# Patient Record
Sex: MALE | Race: WHITE | NOT HISPANIC OR LATINO | Employment: FULL TIME | URBAN - METROPOLITAN AREA
[De-identification: names, ages, dates, MRNs, and addresses within clinical notes are randomized per-mention and may not be internally consistent; named-entity substitution may affect disease eponyms.]

---

## 2022-05-08 ENCOUNTER — HOSPITAL ENCOUNTER (EMERGENCY)
Facility: HOSPITAL | Age: 57
Discharge: HOME | End: 2022-05-08
Attending: EMERGENCY MEDICINE

## 2022-05-08 ENCOUNTER — APPOINTMENT (EMERGENCY)
Dept: CT IMAGING | Facility: HOSPITAL | Age: 57
End: 2022-05-08

## 2022-05-08 VITALS
TEMPERATURE: 98 F | OXYGEN SATURATION: 98 % | HEIGHT: 67 IN | BODY MASS INDEX: 26.68 KG/M2 | HEART RATE: 85 BPM | WEIGHT: 170 LBS | SYSTOLIC BLOOD PRESSURE: 177 MMHG | RESPIRATION RATE: 18 BRPM | DIASTOLIC BLOOD PRESSURE: 89 MMHG

## 2022-05-08 DIAGNOSIS — H05.011: Primary | ICD-10-CM

## 2022-05-08 DIAGNOSIS — J32.9 SINUSITIS: ICD-10-CM

## 2022-05-08 DIAGNOSIS — G03.9 MENINGITIS: ICD-10-CM

## 2022-05-08 LAB
ANION GAP SERPL CALCULATED.3IONS-SCNC: 11 MMOL/L (ref 4–13)
ATRIAL RATE: 85 BPM
BASOPHILS # BLD AUTO: 0.04 THOUSANDS/ΜL (ref 0–0.1)
BASOPHILS NFR BLD AUTO: 0 % (ref 0–1)
BUN SERPL-MCNC: 19 MG/DL (ref 5–25)
CALCIUM SERPL-MCNC: 9.5 MG/DL (ref 8.4–10.2)
CHLORIDE SERPL-SCNC: 101 MMOL/L (ref 96–108)
CO2 SERPL-SCNC: 25 MMOL/L (ref 21–32)
CREAT SERPL-MCNC: 0.93 MG/DL (ref 0.6–1.3)
CRP SERPL HS-MCNC: 72.4 MG/L
EOSINOPHIL # BLD AUTO: 0.09 THOUSAND/ΜL (ref 0–0.61)
EOSINOPHIL NFR BLD AUTO: 1 % (ref 0–6)
ERYTHROCYTE [DISTWIDTH] IN BLOOD BY AUTOMATED COUNT: 12 % (ref 11.6–15.1)
GFR SERPL CREATININE-BSD FRML MDRD: 90 ML/MIN/1.73SQ M
GLUCOSE SERPL-MCNC: 123 MG/DL (ref 65–140)
HCT VFR BLD AUTO: 46 % (ref 36.5–49.3)
HGB BLD-MCNC: 15.2 G/DL (ref 12–17)
IMM GRANULOCYTES # BLD AUTO: 0.04 THOUSAND/UL (ref 0–0.2)
IMM GRANULOCYTES NFR BLD AUTO: 0 % (ref 0–2)
LACTATE SERPL-SCNC: 0.6 MMOL/L (ref 0.5–2)
LYMPHOCYTES # BLD AUTO: 1.86 THOUSANDS/ΜL (ref 0.6–4.47)
LYMPHOCYTES NFR BLD AUTO: 14 % (ref 14–44)
MCH RBC QN AUTO: 31.9 PG (ref 26.8–34.3)
MCHC RBC AUTO-ENTMCNC: 33 G/DL (ref 31.4–37.4)
MCV RBC AUTO: 96 FL (ref 82–98)
MONOCYTES # BLD AUTO: 1.78 THOUSAND/ΜL (ref 0.17–1.22)
MONOCYTES NFR BLD AUTO: 13 % (ref 4–12)
NEUTROPHILS # BLD AUTO: 9.89 THOUSANDS/ΜL (ref 1.85–7.62)
NEUTS SEG NFR BLD AUTO: 72 % (ref 43–75)
NRBC BLD AUTO-RTO: 0 /100 WBCS
P AXIS: 65 DEGREES
PLATELET # BLD AUTO: 282 THOUSANDS/UL (ref 149–390)
PMV BLD AUTO: 10.5 FL (ref 8.9–12.7)
POTASSIUM SERPL-SCNC: 3.4 MMOL/L (ref 3.5–5.3)
PR INTERVAL: 126 MS
QRS AXIS: 50 DEGREES
QRSD INTERVAL: 90 MS
QT INTERVAL: 352 MS
QTC INTERVAL: 418 MS
RBC # BLD AUTO: 4.77 MILLION/UL (ref 3.88–5.62)
SODIUM SERPL-SCNC: 137 MMOL/L (ref 135–147)
T WAVE AXIS: 55 DEGREES
VENTRICULAR RATE: 85 BPM
WBC # BLD AUTO: 13.7 THOUSAND/UL (ref 4.31–10.16)

## 2022-05-08 PROCEDURE — G1004 CDSM NDSC: HCPCS

## 2022-05-08 PROCEDURE — 80048 BASIC METABOLIC PNL TOTAL CA: CPT | Performed by: EMERGENCY MEDICINE

## 2022-05-08 PROCEDURE — 86141 C-REACTIVE PROTEIN HS: CPT | Performed by: INTERNAL MEDICINE

## 2022-05-08 PROCEDURE — 93010 ELECTROCARDIOGRAM REPORT: CPT | Performed by: INTERNAL MEDICINE

## 2022-05-08 PROCEDURE — NC001 PR NO CHARGE: Performed by: INTERNAL MEDICINE

## 2022-05-08 PROCEDURE — 96367 TX/PROPH/DG ADDL SEQ IV INF: CPT

## 2022-05-08 PROCEDURE — 93005 ELECTROCARDIOGRAM TRACING: CPT

## 2022-05-08 PROCEDURE — 99284 EMERGENCY DEPT VISIT MOD MDM: CPT

## 2022-05-08 PROCEDURE — 36415 COLL VENOUS BLD VENIPUNCTURE: CPT | Performed by: EMERGENCY MEDICINE

## 2022-05-08 PROCEDURE — 85025 COMPLETE CBC W/AUTO DIFF WBC: CPT | Performed by: EMERGENCY MEDICINE

## 2022-05-08 PROCEDURE — 96365 THER/PROPH/DIAG IV INF INIT: CPT

## 2022-05-08 PROCEDURE — 87040 BLOOD CULTURE FOR BACTERIA: CPT | Performed by: INTERNAL MEDICINE

## 2022-05-08 PROCEDURE — 70487 CT MAXILLOFACIAL W/DYE: CPT

## 2022-05-08 PROCEDURE — 83605 ASSAY OF LACTIC ACID: CPT | Performed by: INTERNAL MEDICINE

## 2022-05-08 PROCEDURE — 99284 EMERGENCY DEPT VISIT MOD MDM: CPT | Performed by: EMERGENCY MEDICINE

## 2022-05-08 PROCEDURE — 96366 THER/PROPH/DIAG IV INF ADDON: CPT

## 2022-05-08 RX ORDER — AMOXICILLIN AND CLAVULANATE POTASSIUM 875; 125 MG/1; MG/1
1 TABLET, FILM COATED ORAL EVERY 12 HOURS
Qty: 2 TABLET | Refills: 0 | Status: SHIPPED | OUTPATIENT
Start: 2022-05-08 | End: 2022-05-10

## 2022-05-08 RX ORDER — CEFTRIAXONE 2 G/50ML
2000 INJECTION, SOLUTION INTRAVENOUS ONCE
Status: COMPLETED | OUTPATIENT
Start: 2022-05-08 | End: 2022-05-08

## 2022-05-08 RX ADMIN — IOHEXOL 90 ML: 350 INJECTION, SOLUTION INTRAVENOUS at 06:36

## 2022-05-08 RX ADMIN — VANCOMYCIN HYDROCHLORIDE 1500 MG: 1 INJECTION, POWDER, LYOPHILIZED, FOR SOLUTION INTRAVENOUS at 06:38

## 2022-05-08 RX ADMIN — CEFTRIAXONE 2000 MG: 2 INJECTION, SOLUTION INTRAVENOUS at 05:30

## 2022-05-08 NOTE — ED PROVIDER NOTES
History  Chief Complaint   Patient presents with    Eye Swelling     62 yo otherwise healthy male presenting for approximately 1 week of worsening R eyelid swelling and over the last day has been unable to open his R eye  States he has been using tylenol as directed which controls the pain  Denies fevers, night sweats, chills, any other issues or complaints  None       History reviewed  No pertinent past medical history  History reviewed  No pertinent surgical history  History reviewed  No pertinent family history  I have reviewed and agree with the history as documented  E-Cigarette/Vaping    E-Cigarette Use Never User      E-Cigarette/Vaping Substances    Nicotine No      Social History     Tobacco Use    Smoking status: Never Smoker    Smokeless tobacco: Never Used   Vaping Use    Vaping Use: Never used   Substance Use Topics    Alcohol use: Not Currently    Drug use: Not Currently       Review of Systems   Eyes:        R eye swelling   All other systems reviewed and are negative  Physical Exam  Physical Exam  Eyes:        Comments: With assistance of nursing staff (Arlene Bassett, DONAVON) able to open  Patient states able to see out of eye when opened for him  Very tender on evaluation  General: VS reviewed  Appears in NAD  awake, alert  Well-nourished, well-developed  Appears stated age  Speaking normally in full sentences  Head: Normocephalic, atraumatic  ENT: Atraumatic external nose and ears  MMM  No malocclusion  No stridor  Normal phonation  No drooling  Normal swallowing  Neck: No JVD  CV: No pallor noted  Lungs:   No tachypnea  No respiratory distress  MSK:   FROM spontaneously  Skin: Dry, intact  Neuro: Awake, alert, GCS15, CN II-XII grossly intact  Motor grossly intact    Psychiatric/Behavioral: Appropriate mood and affect   Exam: deferred       Vital Signs  ED Triage Vitals [05/08/22 0509]   Temperature Pulse Respirations Blood Pressure SpO2   98 °F (36 7 °C) 86 18 (!) 180/92 98 %      Temp Source Heart Rate Source Patient Position - Orthostatic VS BP Location FiO2 (%)   Tympanic Monitor -- Left arm --      Pain Score       --           Vitals:    05/08/22 0509 05/08/22 0730 05/08/22 0830   BP: (!) 180/92 (!) 177/89    Pulse: 86 84 85         Visual Acuity      ED Medications  Medications   cefTRIAXone (ROCEPHIN) IVPB (premix in dextrose) 2,000 mg 50 mL (0 mg Intravenous Stopped 5/8/22 0643)   vancomycin (VANCOCIN) 1500 mg in sodium chloride 0 9% 250 mL IVPB (0 mg/kg × 77 1 kg Intravenous Stopped 5/8/22 0840)   iohexol (OMNIPAQUE) 350 MG/ML injection (SINGLE-DOSE) 90 mL (90 mL Intravenous Given 5/8/22 0636)       Diagnostic Studies  Results Reviewed     Procedure Component Value Units Date/Time    High sensitivity CRP [[id number]] Resulted: 05/08/22 1617    Lab Status: Final result Specimen: Blood Updated: 05/08/22 1617     CRP, High Sensitivity 72 40 mg/L     Narrative:            HsCRP Level       Relative Risk           <1 0 mg/L          Low           1 0 to 3 0 mg/L    Average           >3 0 mg/L          High        Blood culture #1 [[id number]] Collected: 05/08/22 0713    Lab Status: Preliminary result Specimen: Blood from Arm, Left Updated: 05/08/22 1502     Blood Culture Received in Microbiology Lab  Culture in Progress  Blood culture #2 [[id number]] Collected: 05/08/22 0713    Lab Status: Preliminary result Specimen: Blood from Arm, Right Updated: 05/08/22 1502     Blood Culture Received in Microbiology Lab  Culture in Progress  Lactic acid [[id number]]  (Normal) Collected: 05/08/22 0713    Lab Status: Final result Specimen: Blood from Arm, Right Updated: 05/08/22 0740     LACTIC ACID 0 6 mmol/L     Narrative:      Result may be elevated if tourniquet was used during collection      Basic metabolic panel [[id number]]  (Abnormal) Collected: 05/08/22 0529    Lab Status: Final result Specimen: Blood from Arm, Left Updated: 05/08/22 0617     Sodium 137 mmol/L      Potassium 3 4 mmol/L      Chloride 101 mmol/L      CO2 25 mmol/L      ANION GAP 11 mmol/L      BUN 19 mg/dL      Creatinine 0 93 mg/dL      Glucose 123 mg/dL      Calcium 9 5 mg/dL      eGFR 90 ml/min/1 73sq m     Narrative:      [place] guidelines for Chronic Kidney Disease (CKD):     Stage 1 with normal or high GFR (GFR > 90 mL/min/1 73 square meters)    Stage 2 Mild CKD (GFR = 60-89 mL/min/1 73 square meters)    Stage 3A Moderate CKD (GFR = 45-59 mL/min/1 73 square meters)    Stage 3B Moderate CKD (GFR = 30-44 mL/min/1 73 square meters)    Stage 4 Severe CKD (GFR = 15-29 mL/min/1 73 square meters)    Stage 5 End Stage CKD (GFR <15 mL/min/1 73 square meters)  Note: GFR calculation is accurate only with a steady state creatinine    CBC and differential [[id number]]  (Abnormal) Collected: 05/08/22 0529    Lab Status: Final result Specimen: Blood from Arm, Left Updated: 05/08/22 0539     WBC 13 70 Thousand/uL      RBC 4 77 Million/uL      Hemoglobin 15 2 g/dL      Hematocrit 46 0 %      MCV 96 fL      MCH 31 9 pg      MCHC 33 0 g/dL      RDW 12 0 %      MPV 10 5 fL      Platelets 398 Thousands/uL      nRBC 0 /100 WBCs      Neutrophils Relative 72 %      Immat GRANS % 0 %      Lymphocytes Relative 14 %      Monocytes Relative 13 %      Eosinophils Relative 1 %      Basophils Relative 0 %      Neutrophils Absolute 9 89 Thousands/µL      Immature Grans Absolute 0 04 Thousand/uL      Lymphocytes Absolute 1 86 Thousands/µL      Monocytes Absolute 1 78 Thousand/µL      Eosinophils Absolute 0 09 Thousand/µL      Basophils Absolute 0 04 Thousands/µL                  CT facial bones with contrast   ED Interpretation by Janel Steinberg DO (05/08 1424)   Right frontal sinus mucocele with expansion superiorly and into extra dural space  Right orbital and periorbital cellulitis with multiple peripheral enhancing fluid collections compatible with abscesses    Enhancement of the dura there is in the right frontal lobe consistent with meningitis  A the the early epidural abscess formation could not be excluded  Extensive pansinusitis is also noted  Final Result by Jl Morillo DO (05/08 0720)   1  Infected right frontal sinus mucocele with expansion superiorly into the extradural space and inferolaterally into the right orbit  2   Right orbital and periorbital cellulitis with multiple peripherally enhancing fluid collections, most compatible with abscesses  There is fluid, air and enhancement of the right superior orbital septal plate  3   Enhancement of the dura underlying the right frontal lobe, consistent with meningitis  Early epidural abscess formation not excluded  4   Extensive pansinus disease with heterogeneous enhancement in the left maxillary sinus  Early abscess formation not excluded  Recommend emergent, follow-up ENT and/or neurosurgery consultation recommended  Consider follow-up contrast-enhanced MRI of the brain and orbits  I personally discussed this study with Dr Shiva Alegria from the emergency department on 5/8/2022 at 6:50 AM                   Workstation performed: [id number]                    Procedures  Procedures         ED Course                               SBIRT 22yo+      Most Recent Value   SBIRT (25 yo +)    In order to provide better care to our patients, we are screening all of our patients for alcohol and drug use  Would it be okay to ask you these screening questions? Yes Filed at: 05/08/2022 0555   Initial Alcohol Screen: US AUDIT-C     1  How often do you have a drink containing alcohol? 0 Filed at: 05/08/2022 0555   2  How many drinks containing alcohol do you have on a typical day you are drinking? 0 Filed at: 05/08/2022 0555   3a  Male UNDER 65: How often do you have five or more drinks on one occasion? 0 Filed at: 05/08/2022 0555   3b  FEMALE Any Age, or MALE 65+:  How often do you have 4 or more drinks on one occassion? 0 Filed at: 05/08/2022 0555   Audit-C Score 0 Filed at: 05/08/2022 9699   ASTRID: How many times in the past year have you    Used an illegal drug or used a prescription medication for non-medical reasons? Never Filed at: 05/08/2022 5967                    [facility]  Number of Diagnoses or Management Options  Abscess of periorbital region, right  Meningitis  Sinusitis  Diagnosis management comments: Patient presenting with significant swelling of the right eyelid with inability to open himself  Concern for orbital cellulitis  Discussion with radiology who approved CT scan with IV contrast   Will give vancomycin and Rocephin after initial evaluation  Patient signed out to Dr Anju Dimas pending CT scan result and disposition based on radiologic findings      Disposition  Final diagnoses:   Abscess of periorbital region, right   Meningitis   Sinusitis     Time reflects when diagnosis was documented in both MDM as applicable and the Disposition within this note     Time User Action Codes Description Comment    5/8/2022  8:24 AM Deloris Farnsworth Add [B01 479] Abscess of periorbital region, right     5/8/2022  8:24 AM Deloris Farnsworth Add [G03 9] Meningitis     5/8/2022  8:24 AM Deloris Natarajan [J32 9] Sinusitis       ED Disposition     ED Disposition Condition Date/Time Comment    [facility] May 8, 2022  8:23 AM Date: 5/8/2022  Patient: Vale Zimmer  Admitted: 5/8/2022  5:08 AM  Attending Provider: Pratibha Mata DO    Vale Zimmer or his authorized caregiver has made the decision for the patient to leave the emergency department against the advic e of his attending physician  He or his authorized caregiver has been informed and understands the inherent risks, including death, neurological disability, loss of eye, meningitis or encephalitis, seizure, and other catastrophic events     He or his  authorized caregiver has decided to accept the responsibility for this decision   Star [place]arm[place] and all necessary parties have been advised that he may return for further evaluation or treatment  His condition at time of discharge was stable but ha s a high risk for deterioration  Delfino Choudhury had current vital signs as follows:  BP (!) 180/92 (BP Location: Left arm)   Pulse 86   Temp 98 °F (36 7 °C) (Tympanic)   Resp 18   Ht 5' 7" (1 702 m)   Wt 77 1 kg (170 lb)         Follow-up Information    None         Discharge Medication List as of 5/8/2022  9:06 AM      START taking these medications    Details   amoxicillin-clavulanate (AUGMENTIN) 875-125 mg per tablet Take 1 tablet by mouth every 12 (twelve) hours for 2 days, Starting Sun 5/8/2022, Until Tue 5/10/2022, Normal             No discharge procedures on file      PDMP Review     None          ED Provider  Electronically Signed by           Chandrakant Camilo DO  05/08/22 2036

## 2022-05-08 NOTE — ED PROVIDER NOTES
This note is being dictated after the fact  He received sign-out took over care from Dr Wayne Qureshi at 7:00 a m  Rhonda Cooley Patient receive Rocephin and vancomycin at that time  Discussed urgent need for transfer, however patient declines this  Despite extensive amount of conversation he insisted on returning to his home to get his affairs in order  He is aware that he may die, become neurologically compromised, have meningitis, sepsis, or become permanently disabled  Despite all these warnings he left against medical advice  Case was also discussed with infectious disease through [place] who clearly agrees patient needs inpatient evaluation treatment likely surgical intervention  Approach patient again about his decision of leaving against medical advice and he still demands to be discharged  He states he will return in due time  He is aware oral antibiotics are not indicated at this time  Patient was offered transfer to [place] or [facility] review of [facility]'s if he desired, again declining  Offered speak with family or friends regarding the seriousness of his illness, refuses let me call them  Patient assures me he will return  I informed him he could die within hours, or situation may worsen within hours  Medically appear stable upon discharge       Eryn Florez,   05/08/22 7316

## 2022-05-13 LAB
BACTERIA BLD CULT: NORMAL
BACTERIA BLD CULT: NORMAL